# Patient Record
Sex: MALE | HISPANIC OR LATINO | Employment: FULL TIME | URBAN - METROPOLITAN AREA
[De-identification: names, ages, dates, MRNs, and addresses within clinical notes are randomized per-mention and may not be internally consistent; named-entity substitution may affect disease eponyms.]

---

## 2023-01-23 ENCOUNTER — HOSPITAL ENCOUNTER (EMERGENCY)
Facility: CLINIC | Age: 23
Discharge: HOME | End: 2023-01-23
Attending: FAMILY MEDICINE | Admitting: FAMILY MEDICINE
Payer: OTHER MISCELLANEOUS

## 2023-01-23 ENCOUNTER — APPOINTMENT (OUTPATIENT)
Dept: GENERAL RADIOLOGY | Facility: CLINIC | Age: 23
End: 2023-01-23
Attending: FAMILY MEDICINE
Payer: OTHER MISCELLANEOUS

## 2023-01-23 VITALS
HEART RATE: 78 BPM | SYSTOLIC BLOOD PRESSURE: 106 MMHG | TEMPERATURE: 98.5 F | HEIGHT: 65 IN | DIASTOLIC BLOOD PRESSURE: 74 MMHG | BODY MASS INDEX: 25.66 KG/M2 | RESPIRATION RATE: 16 BRPM | OXYGEN SATURATION: 98 % | WEIGHT: 154 LBS

## 2023-01-23 DIAGNOSIS — S61.213A LACERATION OF LEFT MIDDLE FINGER WITHOUT FOREIGN BODY WITHOUT DAMAGE TO NAIL, INITIAL ENCOUNTER: ICD-10-CM

## 2023-01-23 DIAGNOSIS — S67.22XA CRUSHING INJURY OF LEFT HAND AND FINGER, INITIAL ENCOUNTER: ICD-10-CM

## 2023-01-23 PROCEDURE — 250N000011 HC RX IP 250 OP 636: Performed by: FAMILY MEDICINE

## 2023-01-23 PROCEDURE — 99283 EMERGENCY DEPT VISIT LOW MDM: CPT | Mod: 25 | Performed by: FAMILY MEDICINE

## 2023-01-23 PROCEDURE — 73130 X-RAY EXAM OF HAND: CPT | Mod: LT

## 2023-01-23 PROCEDURE — 90471 IMMUNIZATION ADMIN: CPT | Performed by: FAMILY MEDICINE

## 2023-01-23 PROCEDURE — 12001 RPR S/N/AX/GEN/TRNK 2.5CM/<: CPT | Performed by: FAMILY MEDICINE

## 2023-01-23 PROCEDURE — 90715 TDAP VACCINE 7 YRS/> IM: CPT | Performed by: FAMILY MEDICINE

## 2023-01-23 PROCEDURE — 250N000013 HC RX MED GY IP 250 OP 250 PS 637: Performed by: FAMILY MEDICINE

## 2023-01-23 RX ORDER — LIDOCAINE HYDROCHLORIDE 10 MG/ML
1 INJECTION, SOLUTION INFILTRATION; PERINEURAL ONCE
Status: DISCONTINUED | OUTPATIENT
Start: 2023-01-23 | End: 2023-01-23 | Stop reason: HOSPADM

## 2023-01-23 RX ORDER — IBUPROFEN 600 MG/1
600 TABLET, FILM COATED ORAL ONCE
Status: COMPLETED | OUTPATIENT
Start: 2023-01-23 | End: 2023-01-23

## 2023-01-23 RX ADMIN — IBUPROFEN 600 MG: 600 TABLET ORAL at 14:33

## 2023-01-23 RX ADMIN — CLOSTRIDIUM TETANI TOXOID ANTIGEN (FORMALDEHYDE INACTIVATED), CORYNEBACTERIUM DIPHTHERIAE TOXOID ANTIGEN (FORMALDEHYDE INACTIVATED), BORDETELLA PERTUSSIS TOXOID ANTIGEN (GLUTARALDEHYDE INACTIVATED), BORDETELLA PERTUSSIS FILAMENTOUS HEMAGGLUTININ ANTIGEN (FORMALDEHYDE INACTIVATED), BORDETELLA PERTUSSIS PERTACTIN ANTIGEN, AND BORDETELLA PERTUSSIS FIMBRIAE 2/3 ANTIGEN 0.5 ML: 5; 2; 2.5; 5; 3; 5 INJECTION, SUSPENSION INTRAMUSCULAR at 14:33

## 2023-01-23 NOTE — ED TRIAGE NOTES
Pt got his L hand stuck in a machine at work, small lacerations present to middle three fingers at knuckles. Bleeding controlled, gauze dressing placed. Pt awake and alert, talking in full sentences.     Triage Assessment     Row Name 01/23/23 1241       Triage Assessment (Adult)    Airway WDL WDL       Respiratory WDL    Respiratory WDL WDL       Skin Circulation/Temperature WDL    Skin Circulation/Temperature WDL WDL       Cardiac WDL    Cardiac WDL WDL       Peripheral/Neurovascular WDL    Peripheral Neurovascular WDL WDL       Cognitive/Neuro/Behavioral WDL    Cognitive/Neuro/Behavioral WDL WDL

## 2023-01-23 NOTE — DISCHARGE INSTRUCTIONS
Keep your laceration clean and covered.  Ice and elevate for the next 24 to 48 hours.  Gradually increase your range of motion as far as flexing and extending the fingers.  Sutures to be removed in 7 to 8 days.  Ibuprofen or Tylenol as needed for pain.

## 2023-01-23 NOTE — ED PROVIDER NOTES
"ED Provider Note  [facility]      History     Chief Complaint   Patient presents with     Laceration     HPI  Kj Sotomayor is a 22 year old male who presents to the ED for evaluation of left hand injury. Patient reports that he works at [employer] and was using an industrial machine with a blade when the bottom came upwards, smashing his left first 3 fingers. He has a small laceration over the dorsal aspect of the 2nd PIP and a small laceration just distal to the 3rd PIP. Bleeding is controlled upon arrival with guaze dressing in place. He endorses pain in the 2nd finger. No numbness or tingling. He has not taken anything for pain. Per [facility] records last Tdap 2013.     Past Medical History  No past medical history on file.  No past surgical history on file.  No current outpatient medications on file.    No Known Allergies  Family History  No family history on file.  Social History       Past medical history, past surgical history, medications, allergies, family history, and social history were reviewed with the patient. No additional pertinent items.      A complete review of systems was performed with pertinent positives and negatives noted in the HPI, and all other systems negative.    Physical Exam   BP: 118/67  Pulse: 68  Temp: 98.5  F (36.9  C)  Resp: 18  Height: 165.1 cm (5' 5\")  Weight: 69.9 kg (154 lb)  SpO2: 99 %  Physical Exam  Vitals and nursing note reviewed.   Constitutional:       General: He is not in acute distress.     Appearance: Normal appearance.   HENT:      Head: Normocephalic and atraumatic.   Eyes:      Pupils: Pupils are equal, round, and reactive to light.   Cardiovascular:      Rate and Rhythm: Normal rate and regular rhythm.   Pulmonary:      Effort: Pulmonary effort is normal.   Musculoskeletal:         General: Signs of injury present.        Hands:       Cervical back: Normal range of motion.   Neurological:      Mental Status: He is alert. "               ED Course, Procedures, & Data      Procedures      [facility] Procedure Note        Laceration Repair:    Performed by: Donald Hawley MD  Authorized by: Donald Hawley MD  Consent given by: Patient who states understanding of the procedure being performed after discussing the risks, benefits and alternatives.    Preparation: Patient was prepped and draped in usual sterile fashion.  Irrigation solution: saline    Body area: Left middle finger  Laceration length: 1cm  Contamination: The wound is not contaminated.  Foreign bodies:none  Tendon involvement: none  Anesthesia: Local  Local anesthetic: Lidocaine     1%  Anesthetic total: 1ml    Debridement: none  Skin closure: Closed with 3 x 4.0 Ethilon  Technique: interrupted  Approximation: close  Approximation difficulty: simple    Patient tolerance: Patient tolerated the procedure well with no immediate complications.                  Results for orders placed or performed during the hospital encounter of 01/23/23   XR Hand Left G/E 3 Views     Status: None    Narrative    HAND THREE VIEWS LEFT  1/23/2023 1:39 PM     HISTORY: Trauma, middle, ring and little finger; Pain  COMPARISON: None.    FINDINGS: There is no significant degenerative change. There is no  acute fracture or dislocation. There are no worrisome bony lesions.  Mild soft tissue swelling.      Impression    IMPRESSION: No acute osseous abnormality demonstrated.    BRET FULLER MD         SYSTEM ID:  [id number]     Medications   lidocaine 1 % injection 1 mL (has no administration in time range)   ibuprofen (ADVIL/MOTRIN) tablet 600 mg (600 mg Oral Given 1/23/23 1433)   Tdap (tetanus-diphtheria-acell pertussis) (ADACEL) injection 0.5 mL (0.5 mLs Intramuscular Given 1/23/23 1433)     Labs Ordered and Resulted from Time of ED Arrival to Time of ED Departure - No data to display  XR Hand Left G/E 3 Views   Final Result   IMPRESSION: No acute osseous abnormality demonstrated.       BRET FULLER MD            SYSTEM ID:  [id number]             Medical Decision Making  The patient's presentation is strongly suggestive of an acute and uncomplicated illness or injury.    The patient's evaluation involved:  ordering and/or review of 1 test(s) in this encounter (Imaging finger without fracture or foreign body)    The patient's management involved a decision regarding minor procedure/surgery with identified risk factors.      Assessment & Plan    A 22-year-old male who his hand was crushed in an industrial meat cutting machine with the blade, causing a laceration over his middle finger PIP joint which does not appear to involve the joint itself where the tendon.  Also has a superficial laceration over the middle phalanx of the adjacent digit.  No evidence of neurovascular compromise, and imaging shows no fracture.  The laceration was bleeding copiously so had to be anesthetized, copiously irrigated and closed with sutures.  His tetanus immunization was updated.  He was given wound care instructions.  We discussed the indications for emergency department return and follow-up.  Stable for discharge.      I have reviewed the nursing notes. I have reviewed the findings, diagnosis, plan and need for follow up with the patient.    New Prescriptions    No medications on file       Final diagnoses:   Laceration of left middle finger without foreign body without damage to nail, initial encounter   Crushing injury of left hand and finger, initial encounter   I, Katelyn Varma, am serving as a trained medical scribe to document services personally performed by Donald Hawley MD, based on the provider's statements to me.     I, Donald Hawley MD, was physically present and have reviewed and verified the accuracy of this note documented by Katelyn Varma.      Donald Hawley MD  [facility] EMERGENCY DEPARTMENT  1/23/2023     Donald Hawley MD  01/23/23 1152

## 2023-02-03 ENCOUNTER — HOSPITAL ENCOUNTER (EMERGENCY)
Facility: CLINIC | Age: 23
Discharge: HOME | End: 2023-02-03
Admitting: FAMILY MEDICINE
Payer: COMMERCIAL

## 2023-02-03 VITALS
TEMPERATURE: 98 F | HEART RATE: 63 BPM | SYSTOLIC BLOOD PRESSURE: 110 MMHG | RESPIRATION RATE: 16 BRPM | OXYGEN SATURATION: 99 % | DIASTOLIC BLOOD PRESSURE: 73 MMHG

## 2023-02-03 DIAGNOSIS — Z48.02 ENCOUNTER FOR REMOVAL OF SUTURES: ICD-10-CM

## 2023-02-03 PROCEDURE — 99283 EMERGENCY DEPT VISIT LOW MDM: CPT | Performed by: FAMILY MEDICINE

## 2023-02-03 RX ORDER — CEPHALEXIN 500 MG/1
500 CAPSULE ORAL 4 TIMES DAILY
Qty: 20 CAPSULE | Refills: 0 | Status: SHIPPED | OUTPATIENT
Start: 2023-02-03 | End: 2023-02-08

## 2023-02-03 ASSESSMENT — ACTIVITIES OF DAILY LIVING (ADL): ADLS_ACUITY_SCORE: 35

## 2023-02-03 NOTE — DISCHARGE INSTRUCTIONS
Keep area clean and covered until the wound is completely healed.  Return if the appearance of the wound is worse, if there is redness or increased drainage.  Take Keflex as prescribed.

## 2023-02-03 NOTE — ED NOTES
Pt was evaluated by MD and determined appropriate for discharge to self. Instructions for follow up were discussed with patient by MD. When writer went to give patient discharge paperwork and keflex prescription, pt was observed to have left the unit. Writer made multiple attempts to call pt phone number but call did not go through. Paperwork left at the  if patient returns.

## 2023-02-03 NOTE — ED PROVIDER NOTES
ED Provider Note  [facility]      History     Chief Complaint   Patient presents with     Suture Removal     3 sutures at L 3rd finger placed 1/23     HPI  Kj Sotomayor is a 22 year old male who presents for suture removal.  Sutures were placed on January 23, he was delayed in getting here for the removal.  He denies any problems or complications.  Has not noted any drainage redness or swelling.  No increased pain    Past Medical History  History reviewed. No pertinent past medical history.  No past surgical history on file.  cephALEXin (KEFLEX) 500 MG capsule      No Known Allergies  Family History  No family history on file.  Social History       Past medical history, past surgical history, medications, allergies, family history, and social history were reviewed with the patient. No additional pertinent items.      A complete review of systems was performed with pertinent positives and negatives noted in the HPI, and all other systems negative.    Physical Exam   BP: 110/73  Pulse: 63  Temp: 98  F (36.7  C)  Resp: 16  SpO2: 99 %  Physical Exam  Vitals and nursing note reviewed.   Constitutional:       Appearance: Normal appearance.   HENT:      Head: Normocephalic.      Nose: Nose normal.   Eyes:      Pupils: Pupils are equal, round, and reactive to light.   Cardiovascular:      Rate and Rhythm: Normal rate.   Musculoskeletal:         General: Tenderness present.        Hands:    Skin:     General: Skin is warm and dry.   Neurological:      Mental Status: He is alert.           ED Course, Procedures, & Data      Procedures                   No results found for any visits on 02/03/23.  Medications - No data to display  Labs Ordered and Resulted from Time of ED Arrival to Time of ED Departure - No data to display  No orders to display          Medical Decision Making  The patient's presentation is strongly suggestive of a clearly self-limited or minor problem.    The patient's  evaluation involved:  history and exam without other MDM data elements    The patient's management involved prescription drug management (including medications given in the ED).      Assessment & Plan    Patient had sutures placed 10 days ago comes into the sutures removed.  Sutures were removed and there was a small amount of yellow discharge and 1 small area of wound dehiscence.  No definite cellulitis.  No joint involvement or tendon involvement.  He was placed in a clean dressing and given Keflex with further wound care instructions.  Stable for discharge.    I have reviewed the nursing notes. I have reviewed the findings, diagnosis, plan and need for follow up with the patient.    New Prescriptions    CEPHALEXIN (KEFLEX) 500 MG CAPSULE    Take 1 capsule (500 mg) by mouth 4 times daily for 5 days       Final diagnoses:   Encounter for removal of sutures       Donald Hawley MD  [facility] EMERGENCY DEPARTMENT  2/3/2023     Donald Hawley MD  02/03/23 1515